# Patient Record
Sex: FEMALE | URBAN - METROPOLITAN AREA
[De-identification: names, ages, dates, MRNs, and addresses within clinical notes are randomized per-mention and may not be internally consistent; named-entity substitution may affect disease eponyms.]

---

## 2019-12-30 ENCOUNTER — OFFICE VISIT (OUTPATIENT)
Dept: URBAN - METROPOLITAN AREA CLINIC 40 | Facility: CLINIC | Age: 59
End: 2019-12-30
Payer: COMMERCIAL

## 2019-12-30 DIAGNOSIS — H52.223 REGULAR ASTIGMATISM, BILATERAL: Primary | ICD-10-CM

## 2019-12-30 PROCEDURE — 92004 COMPRE OPH EXAM NEW PT 1/>: CPT | Performed by: OPTOMETRIST

## 2019-12-30 ASSESSMENT — KERATOMETRY
OD: 44.75
OS: 43.00

## 2019-12-30 ASSESSMENT — INTRAOCULAR PRESSURE
OD: 13
OS: 14

## 2019-12-30 ASSESSMENT — VISUAL ACUITY
OS: 20/20
OD: 20/20

## 2022-06-20 ENCOUNTER — OFFICE VISIT (OUTPATIENT)
Dept: URBAN - METROPOLITAN AREA CLINIC 30 | Facility: CLINIC | Age: 62
End: 2022-06-20
Payer: COMMERCIAL

## 2022-06-20 DIAGNOSIS — C43.9 MELANOMA: ICD-10-CM

## 2022-06-20 DIAGNOSIS — H25.813 COMBINED FORMS OF AGE-RELATED CATARACT, BILATERAL: ICD-10-CM

## 2022-06-20 DIAGNOSIS — H50.15 ALTERNATING EXOTROPIA: ICD-10-CM

## 2022-06-20 DIAGNOSIS — H11.063 RECURRENT PTERYGIUM, BILATERAL: ICD-10-CM

## 2022-06-20 DIAGNOSIS — H53.2 DIPLOPIA: ICD-10-CM

## 2022-06-20 DIAGNOSIS — H43.813 VITREOUS DEGENERATION, BILATERAL: Primary | ICD-10-CM

## 2022-06-20 PROCEDURE — 92134 CPTRZ OPH DX IMG PST SGM RTA: CPT | Performed by: OPTOMETRIST

## 2022-06-20 PROCEDURE — 99204 OFFICE O/P NEW MOD 45 MIN: CPT | Performed by: OPTOMETRIST

## 2022-06-20 ASSESSMENT — KERATOMETRY
OD: 43.05
OS: 42.99

## 2022-06-20 ASSESSMENT — VISUAL ACUITY
OS: 20/20
OD: 20/20

## 2022-06-20 ASSESSMENT — INTRAOCULAR PRESSURE
OD: 11
OS: 16

## 2022-06-20 NOTE — IMPRESSION/PLAN
Impression: Melanoma: C43.9. Plan: Mucosal melanoma per pt history. Infusion tx q3 weeks, last dose on 5/13/22, that was second dose per pt. Was paused due to pulmonary complication- inflammation right lobe of lung. On current taper for oral pred. Will resume tx once tx.

## 2022-06-20 NOTE — IMPRESSION/PLAN
Impression: Diplopia: H53.2. Plan: Onset mid May 2022-was seen by ophthalmologist (Dr. Tyrone Haque), MRI was ordered and completed at [facility], had CT scan as well- everything was clear. Lumbar puncture was clear. Saw ENT - treated with antibiotic for sinus infection and diplopia decreased but did not resolve (5/25/22). Later had chest xray, treated for pneumonia, oral prednisone, tapering-down to 10 mg. Pt notes diplopia diminished when on higher dose of prednisone (40 mg), and is now recurring as tapering. Referred to neuro-ophthalmology and Dr. Isabell Nagy does not take insurance. Here for referral to neuro-oph. Contact info for neuro-oph in area.